# Patient Record
Sex: FEMALE | Race: AMERICAN INDIAN OR ALASKA NATIVE
[De-identification: names, ages, dates, MRNs, and addresses within clinical notes are randomized per-mention and may not be internally consistent; named-entity substitution may affect disease eponyms.]

---

## 2017-08-02 ENCOUNTER — HOSPITAL ENCOUNTER (OUTPATIENT)
Dept: HOSPITAL 5 - MAMMO | Age: 57
Discharge: HOME | End: 2017-08-02
Attending: FAMILY MEDICINE
Payer: COMMERCIAL

## 2017-08-02 DIAGNOSIS — Z12.31: Primary | ICD-10-CM

## 2017-08-02 PROCEDURE — G0202 SCR MAMMO BI INCL CAD: HCPCS

## 2017-08-02 PROCEDURE — 77067 SCR MAMMO BI INCL CAD: CPT

## 2017-08-02 NOTE — MAMMOGRAPHY REPORT
BILATERAL MAMMOGRAM with CAD:



HISTORY:Cancer screening.



FINDINGS:

The breasts are almost entirely fat (<25% glandular).  No mass,

distortion, suspicious calcification, or skin change is seen. A 

circumscribed nodular opacity in the upper-outer quadrant of the left 

breast with a fatty hilus probably represents a small intramammary 

lymph node.



IMPRESSION:

Negative mammogram.  There is no mammographic evidence of

malignancy.



RECOMMENDATION:

Follow-up per ACS guidelines.



BI-RADS CATEGORY: 1 = Negative



ACR BI-RADS MAMMOGRAPHIC CODES:

0 = Needs additional imaging evaluation; 1 = Negative; 2 = Benign;

3 = Probably benign; 4 = Suspicious; 5 = Malignant; 6 = Known

biopsy-proven malignancy



COMMENT:

      1.   Dense breast tissue, i.e., adenosis, fibrocystic 

            changes, etc., may obscure an underlying neoplasm.

      2.   Approximately 10% of cancers are not detected with

            mammography.

      3.   A negative mammography report should not delay biopsy 

            if a clinically suspicious mass is present.



COMMENT:

Patient follow-up letters are generated in [employer]

## 2018-06-15 ENCOUNTER — HOSPITAL ENCOUNTER (EMERGENCY)
Dept: HOSPITAL 5 - ED | Age: 58
Discharge: HOME | End: 2018-06-15
Payer: COMMERCIAL

## 2018-06-15 VITALS — DIASTOLIC BLOOD PRESSURE: 120 MMHG | SYSTOLIC BLOOD PRESSURE: 206 MMHG

## 2018-06-15 DIAGNOSIS — M71.22: Primary | ICD-10-CM

## 2018-06-15 DIAGNOSIS — Z98.51: ICD-10-CM

## 2018-06-15 DIAGNOSIS — Z88.0: ICD-10-CM

## 2018-06-15 PROCEDURE — 99283 EMERGENCY DEPT VISIT LOW MDM: CPT

## 2018-06-15 NOTE — XRAY REPORT
FINAL REPORT



PROCEDURE:  Left ankle. 



TECHNIQUE:  AP and lateral views.



HISTORY:  Ankle and leg swelling. 



COMPARISON:  No prior studies are available for comparison.



FINDINGS:  

The bones appear intact without fracture or dislocation. The

joint spaces appear satisfactory. There is some soft tissue

swelling in the ankle.



IMPRESSION:  

Soft tissue swelling.

## 2018-06-15 NOTE — EMERGENCY DEPARTMENT REPORT
ED General Adult HPI





- General


Chief complaint: Extremity Injury, Lower


Stated complaint: LEFT LEG,FOOT&ANKLE SWOLLEN


Time Seen by Provider: 06/15/18 15:04


Source: patient


Mode of arrival: Ambulatory


Limitations: No Limitations





- History of Present Illness


Initial comments: 





Mrs. Martines is a pleasant 57-year-old female history uterine fibroids and 

severe obesity.  She woke up this morning with moderately severe pain in the 

left leg from the calf to the foot.  She has left ankle swelling.  Denies 

trauma.  No history of arthritis.  No previous history of DVT.  She has been 

taking hormone therapy Norethindrone for the past 2 months for severe bleeding 

related to uterine fibroids.  She denies chest pain.  Denies Shortness breath.  

She denies any other associated symptoms.  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 

associated at [employer].  Several weeks ago, she flew to [facility] by 

plane.  The flight lasted one hour and 40 minutes.





- Related Data


 Previous Rx's











 Medication  Instructions  Recorded  Last Taken  Type


 


Naproxen 500 mg PO BID 10 Days #20 tablet 06/15/18 Unknown Rx











 Allergies











Allergy/AdvReac Type Severity Reaction Status Date / Time


 


Penicillins Allergy  Itching Unverified 08/02/17 11:04














ED Review of Systems


ROS: 


Stated complaint: LEFT LEG,FOOT&ANKLE SWOLLEN


Other details as noted in HPI





Comment: All other systems reviewed and negative


Constitutional: denies: fever, malaise


Respiratory: denies: cough


Cardiovascular: denies: chest pain, palpitations





ED Past Medical Hx





- Past Medical History


Additional medical history: FIBROIDS





- Surgical History


Past Surgical History?: Yes


Additional Surgical History: TUBAL LIGATION





- Social History


Smoking Status: Never Smoker


Substance Use Type: None





- Medications


Home Medications: 


 Home Medications











 Medication  Instructions  Recorded  Confirmed  Last Taken  Type


 


Naproxen 500 mg PO BID 10 Days #20 tablet 06/15/18  Unknown Rx














ED Physical Exam





- General


Limitations: No Limitations


General appearance: alert, in no apparent distress





- Head


Head exam: Present: atraumatic, normocephalic





- Eye


Eye exam: Present: normal appearance





- ENT


ENT exam: Present: mucous membranes moist





- Neck


Neck exam: Present: normal inspection.  Absent: tenderness





- Respiratory


Respiratory exam: Present: normal lung sounds bilaterally.  Absent: respiratory 

distress, wheezes, rales, rhonchi





- Cardiovascular


Cardiovascular Exam: Present: regular rate, normal rhythm, normal heart sounds.

  Absent: systolic murmur, diastolic murmur, rubs, gallop





- GI/Abdominal


GI/Abdominal exam: Present: soft, normal bowel sounds.  Absent: distended, 

tenderness





- Extremities Exam


Extremities exam: Present: other (left lower leg leg is larger than the right 

lower leg from the calf to the foot with ankle edema bilateral pedal pulses 

intact 2+ DP)





- Back Exam


Back exam: Present: normal inspection





- Neurological Exam


Neurological exam: Present: alert, oriented X3





- Psychiatric


Psychiatric exam: Present: normal affect, normal mood





- Skin


Skin exam: Present: warm, dry, intact, normal color, other (no signs of 

cellulitis, several varicose veins in both extremities more prominent on the 

left).  Absent: rash





ED Course


 Vital Signs











  06/15/18





  13:53


 


Temperature 98.6 F


 


Pulse Rate 71


 


Blood Pressure 206/120


 


O2 Sat by Pulse 100





Oximetry 














ED Medical Decision Making





- Radiology Data


Radiology results: report reviewed





I spoke her radiologist directly.  He informed me that there is soft tissue 

formation in the posterior left leg likely early Baker cyst.  No evidence of 

DVT 





I reviewed radiology report of left ankle:  soft tissue swelling





- Medical Decision Making





Left leg pain likely due to Baker's cyst.  I recommended repeat ultrasound at 

her [facility] within a week.  I prescribed NSAIDs.








Critical care attestation.: 


If time is entered above; I have spent that time in minutes in the direct care 

of this critically ill patient, excluding procedure time.








ED Disposition


Clinical Impression: 


 Baker's cyst of knee





Disposition: DC-01 TO HOME OR SELFCARE


Is pt being admited?: No


Does the pt Need Aspirin: No


Condition: Stable


Instructions:  Baker's Cyst (ED)


Additional Instructions: 


You need a repeat ultrasound in one week.


Prescriptions: 


Naproxen 500 mg PO BID 10 Days #20 tablet


Referrals: 


PRIMARY CARE,MD [Primary Care Provider] - 3-5 Days


Time of Disposition: 16:43